# Patient Record
Sex: MALE | Race: OTHER | NOT HISPANIC OR LATINO | URBAN - METROPOLITAN AREA
[De-identification: names, ages, dates, MRNs, and addresses within clinical notes are randomized per-mention and may not be internally consistent; named-entity substitution may affect disease eponyms.]

---

## 2022-09-13 ENCOUNTER — EMERGENCY (EMERGENCY)
Facility: HOSPITAL | Age: 22
LOS: 1 days | Discharge: HOME | End: 2022-09-13
Attending: STUDENT IN AN ORGANIZED HEALTH CARE EDUCATION/TRAINING PROGRAM | Admitting: EMERGENCY MEDICINE

## 2022-09-13 VITALS
OXYGEN SATURATION: 98 % | HEART RATE: 78 BPM | WEIGHT: 130.07 LBS | RESPIRATION RATE: 18 BRPM | TEMPERATURE: 98 F | SYSTOLIC BLOOD PRESSURE: 124 MMHG | HEIGHT: 63 IN | DIASTOLIC BLOOD PRESSURE: 73 MMHG

## 2022-09-13 PROCEDURE — 12001 RPR S/N/AX/GEN/TRNK 2.5CM/<: CPT

## 2022-09-13 PROCEDURE — 99283 EMERGENCY DEPT VISIT LOW MDM: CPT | Mod: 25

## 2022-09-13 NOTE — ED PROVIDER NOTE - NSFOLLOWUPINSTRUCTIONS_ED_ALL_ED_FT
1) Follow up with your doctor this week as needed  2) Return to the ED immediately for new or worsening symptoms   3) Please continue to take any home medications as prescribed  4) Please allow the medical glue to fall off naturally, do not pick it off.     Laceration    A laceration is a cut that goes through all of the layers of the skin and into the tissue that is right under the skin. Some lacerations heal on their own. Others need to be closed with skin adhesive strips, skin glue, stitches (sutures), or staples. Proper laceration care minimizes the risk of infection and helps the laceration to heal better.  If non-absorbable stitches or staples have been placed, they must be taken out within the time frame instructed by your healthcare provider.    SEEK IMMEDIATE MEDICAL CARE IF YOU HAVE ANY OF THE FOLLOWING SYMPTOMS: swelling around the wound, worsening pain, drainage from the wound, red streaking going away from your wound, inability to move finger or toe near the laceration, or discoloration of skin near the laceration.

## 2022-09-13 NOTE — ED PROVIDER NOTE - PATIENT PORTAL LINK FT
You can access the FollowMyHealth Patient Portal offered by [facility] by registering at the following website: [email or web address]. By joining [employer]’s FollowMyHealth portal, you will also be able to view your health information using other applications (apps) compatible with our system.

## 2022-09-13 NOTE — ED PROVIDER NOTE - PHYSICAL EXAMINATION
Gen: NAD, AOx3, able to make needs known, non-toxic  Head: NCAT  HEENT: EOMI, oral mucosa moist, normal conjunctiva  Lung: CTAB, no respiratory distress, no wheezes/rhonchi/rales B/L, speaking in full sentences  CV: RRR, no murmurs  Abd: non distended, soft, nontender, no guarding  MSK: no visible deformities  Neuro: Appears non focal  Skin: Warm, well perfused. L hand: 3rd digit w/ avulsion approx 1 cm of tip of digit, NVI  Psych: normal affect

## 2022-09-13 NOTE — ED ADULT NURSE NOTE - OBJECTIVE STATEMENT
Pt presents to ER with avulsion to the left middle finger from an exacto knife. Pt. received tdap from nurses office at school. Pressure dressing applied in triage

## 2022-09-13 NOTE — ED PROVIDER NOTE - CLINICAL SUMMARY MEDICAL DECISION MAKING FREE TEXT BOX
20 y/o M w/ no sig PMH presenting w/ L 3rd digit avulsion injury. Pt well appearing no acute distress. L hand NVI. Avulsion not amenable to suture repair given small piece of tissue. Will remove small piece of tissue as it is only attached by thin piece of skin and apply dermabond cap to wound to allow to heal by granulation. Will DC after that. Return precautions provided and pt verbalized understanding.

## 2022-09-13 NOTE — ED ADULT TRIAGE NOTE - CHIEF COMPLAINT QUOTE
pt. with avulsion to the left middle finger from an exacto knife. Pt. received tdap from nurses office at school. Pressure dressing applied in triage

## 2022-09-13 NOTE — ED PROVIDER NOTE - OBJECTIVE STATEMENT
22 y/o M w/ no sig PMH presenting w/ L 3rd finger injury. 20 y/o M w/ no sig PMH presenting w/ L 3rd finger injury. Pt is student at [employer] and was using exacto knife to cut material. Was not paying attention and didn't realize his finger was in the path of knife and accidentally cut himself on middle finger of left hand. Went to school clinic and was given tetanus update and recommended to come to ED for eval. Denies additional complaints. Endorsing only minor pain at site of cut.
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
## 2022-09-16 DIAGNOSIS — S61.203A UNSPECIFIED OPEN WOUND OF LEFT MIDDLE FINGER WITHOUT DAMAGE TO NAIL, INITIAL ENCOUNTER: ICD-10-CM
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
## 2023-01-13 NOTE — ED ADULT NURSE NOTE - PAIN RATING/NUMBER SCALE (0-10): REST
0 Griseofulvin Counseling:  I discussed with the patient the risks of griseofulvin including but not limited to photosensitivity, cytopenia, liver damage, nausea/vomiting and severe allergy.  The patient understands that this medication is best absorbed when taken with a fatty meal (e.g., ice cream or french fries).

## 2023-06-22 ENCOUNTER — EMERGENCY (EMERGENCY)
Facility: HOSPITAL | Age: 23
LOS: 1 days | Discharge: HOME | End: 2023-06-22
Attending: EMERGENCY MEDICINE | Admitting: EMERGENCY MEDICINE
Payer: MEDICAID

## 2023-06-22 VITALS
HEART RATE: 84 BPM | DIASTOLIC BLOOD PRESSURE: 73 MMHG | OXYGEN SATURATION: 100 % | RESPIRATION RATE: 18 BRPM | SYSTOLIC BLOOD PRESSURE: 108 MMHG | TEMPERATURE: 98 F

## 2023-06-22 PROBLEM — Z78.9 OTHER SPECIFIED HEALTH STATUS: Chronic | Status: ACTIVE | Noted: 2022-09-13

## 2023-06-22 PROCEDURE — 99284 EMERGENCY DEPT VISIT MOD MDM: CPT

## 2023-06-22 RX ORDER — DEXAMETHASONE 0.5 MG/5ML
10 ELIXIR ORAL ONCE
Refills: 0 | Status: COMPLETED | OUTPATIENT
Start: 2023-06-22 | End: 2023-06-22

## 2023-06-22 RX ADMIN — Medication 10 MILLIGRAM(S): at 18:28

## 2023-06-22 NOTE — ED PROVIDER NOTE - OBJECTIVE STATEMENT
22-year-old male presents emergency department for 3 days of sore throat.  Pain is worse with swallowing.  No fever no chills.  Patient went to urgent care and they told him that his breathing looked abnormal his heart rate was beating fast and he needed to urgently come to the emergency department. Patient's not having any shortness of breath chest pain palpitations.  At urgent care they prescribed him prednisone and azithromycin.  They did a rapid strep which was negative.  Patient states he does not want to fill the prescriptions. Patient states he has had strep in the past and his rapid strep is always negative and would like a throat culture.

## 2023-06-22 NOTE — ED PROVIDER NOTE - PATIENT PORTAL LINK FT
You can access the FollowMyHealth Patient Portal offered by [facility] by registering at the following website: [email or web address]. By joining [employer]’s FollowMyHealth portal, you will also be able to view your health information using other applications (apps) compatible with our system.

## 2023-06-22 NOTE — ED PROVIDER NOTE - PHYSICAL EXAMINATION
Const: NAD  HENT:  Neck supple without meningismus, Midline uvula no tonsillar exudate.  Patient has erythematous tonsils.  No stridor no drooling.   CV: RRR, Warm, well-perfused extremities  RESP: CTA B/L, no tachypnea   MSK: No gross deformities appreciated  Skin: Warm, dry. No rashes  Neuro: Alert, CNs II-XII grossly intact. Sensation and motor function of extremities grossly intact.  Psych: Appropriate mood and affect.

## 2023-06-22 NOTE — ED ADULT NURSE NOTE - TEMPLATE
DJZ message sent to patient to confirm how he is currently taking Gabapentin and how many pills he has left.  Patient scheduled for FUV with Lynn Bustillos PA-C on 5/9/23.   General

## 2023-06-22 NOTE — ED PROVIDER NOTE - CLINICAL SUMMARY MEDICAL DECISION MAKING FREE TEXT BOX
22-year-old male presents to the emergency department for sore throat.  Vital signs within normal limits.  No sign of airway compromise.  Throat culture sent and patient given dose of Decadron in the emergency department.  Strict return precautions given patient understands DC home.

## 2023-06-22 NOTE — ED ADULT NURSE NOTE - CHIEF COMPLAINT QUOTE
pt sent from [facility] for elevated heart rate. complaining of sore throat and swollen tonsils times three days.

## 2023-06-22 NOTE — ED ADULT TRIAGE NOTE - CHIEF COMPLAINT QUOTE
pt sent from [facility] for elevated heart rate. complaining of sore throat and swollen tonsils times three days.

## 2023-06-24 LAB
CULTURE RESULTS: SIGNIFICANT CHANGE UP
SPECIMEN SOURCE: SIGNIFICANT CHANGE UP

## 2023-06-25 RX ORDER — AMOXICILLIN 250 MG/5ML
1 SUSPENSION, RECONSTITUTED, ORAL (ML) ORAL
Qty: 20 | Refills: 0
Start: 2023-06-25 | End: 2023-07-04

## 2023-06-25 NOTE — ED POST DISCHARGE NOTE - DETAILS
grp a throat culture positive. left message for results line for treatement and grp a throat culture positive. patient with mild sore throat. called in pharm abx. called in amoxcillin.

## 2023-06-26 DIAGNOSIS — J02.9 ACUTE PHARYNGITIS, UNSPECIFIED: ICD-10-CM
